# Patient Record
Sex: FEMALE | Race: WHITE | NOT HISPANIC OR LATINO | URBAN - METROPOLITAN AREA
[De-identification: names, ages, dates, MRNs, and addresses within clinical notes are randomized per-mention and may not be internally consistent; named-entity substitution may affect disease eponyms.]

---

## 2021-03-06 ENCOUNTER — APPOINTMENT (RX ONLY)
Dept: URBAN - METROPOLITAN AREA CLINIC 371 | Facility: CLINIC | Age: 31
Setting detail: DERMATOLOGY
End: 2021-03-06

## 2021-03-06 DIAGNOSIS — D22 MELANOCYTIC NEVI: ICD-10-CM

## 2021-03-06 DIAGNOSIS — L73.2 HIDRADENITIS SUPPURATIVA: ICD-10-CM | Status: INADEQUATELY CONTROLLED

## 2021-03-06 PROBLEM — D48.5 NEOPLASM OF UNCERTAIN BEHAVIOR OF SKIN: Status: ACTIVE | Noted: 2021-03-06

## 2021-03-06 PROCEDURE — 99204 OFFICE O/P NEW MOD 45 MIN: CPT | Mod: 25

## 2021-03-06 PROCEDURE — ? MEDICATION COUNSELING

## 2021-03-06 PROCEDURE — ? TREATMENT REGIMEN

## 2021-03-06 PROCEDURE — ? BIOPSY BY SHAVE METHOD

## 2021-03-06 PROCEDURE — ? PRESCRIPTION

## 2021-03-06 PROCEDURE — ? COUNSELING

## 2021-03-06 PROCEDURE — 11102 TANGNTL BX SKIN SINGLE LES: CPT

## 2021-03-06 PROCEDURE — ? ORDER TESTS

## 2021-03-06 RX ORDER — NEOMYCIN SULFATE AND FLUOCINOLONE ACETONIDE 3.5; .25 MG/G; MG/G
CREAM TOPICAL
Qty: 1 | Refills: 2 | Status: ERX | COMMUNITY
Start: 2021-03-06

## 2021-03-06 RX ORDER — DICAPRYLYL CARBONATE/DIMETH
SPRAY, NON-AEROSOL (ML) TOPICAL
Qty: 1 | Refills: 2 | Status: ERX | COMMUNITY
Start: 2021-03-06

## 2021-03-06 RX ORDER — SARECYCLINE HYDROCHLORIDE 150 MG/1
TABLET, COATED ORAL
Qty: 30 | Refills: 2 | Status: ERX | COMMUNITY
Start: 2021-03-06

## 2021-03-06 RX ADMIN — NEOMYCIN SULFATE AND FLUOCINOLONE ACETONIDE: 3.5; .25 CREAM TOPICAL at 00:00

## 2021-03-06 RX ADMIN — Medication: at 00:00

## 2021-03-06 RX ADMIN — SARECYCLINE HYDROCHLORIDE: 150 TABLET, COATED ORAL at 00:00

## 2021-03-06 ASSESSMENT — LOCATION SIMPLE DESCRIPTION DERM
LOCATION SIMPLE: LEFT INNER THIGH
LOCATION SIMPLE: ABDOMEN
LOCATION SIMPLE: LEFT UPPER BACK
LOCATION SIMPLE: LEFT AXILLARY VAULT
LOCATION SIMPLE: RIGHT INNER THIGH
LOCATION SIMPLE: RIGHT AXILLARY VAULT

## 2021-03-06 ASSESSMENT — LOCATION DETAILED DESCRIPTION DERM
LOCATION DETAILED: LEFT RIB CAGE
LOCATION DETAILED: LEFT AXILLARY VAULT
LOCATION DETAILED: RIGHT MEDIAL POSTERIOR THIGH
LOCATION DETAILED: RIGHT RIB CAGE
LOCATION DETAILED: LEFT MID-UPPER BACK
LOCATION DETAILED: RIGHT AXILLARY VAULT
LOCATION DETAILED: LEFT MEDIAL POSTERIOR THIGH

## 2021-03-06 ASSESSMENT — LOCATION ZONE DERM
LOCATION ZONE: TRUNK
LOCATION ZONE: AXILLAE
LOCATION ZONE: LEG

## 2021-03-06 NOTE — PROCEDURE: MEDICATION COUNSELING
comfortable appearance Rifampin Pregnancy And Lactation Text: This medication is Pregnancy Category C and it isn't know if it is safe during pregnancy. It is also excreted in breast milk and should not be used if you are breast feeding.

## 2021-03-06 NOTE — PROCEDURE: TREATMENT REGIMEN
Detail Level: Zone
Plan: Patient states she had GI Upset and diarrhea from doxycycline previously but not a true allergic reaction.  She stated it was worse with the 100mg dosage but not to the higher dosages of doxycycline\\n\\nWill plan to start Humira\\nPatient was given requisition form for the BW\\nHumira consent was given to patient\\n\\nPatient states she has a history of abnormal BW with her lymphocytes and was advised to consult with a hematologist.  Advised patient to consult with hematologist prior to starting Humira
Initiate Treatment: Cleanse with Levycin dermal spray twice a day \\nApply a thin layer of Ravinder-Synalar  to the affected areas twice a day \\nTake one tablet Seysara 150 mg daily

## 2021-03-06 NOTE — PROCEDURE: MEDICATION COUNSELING
Xelhugoz Pregnancy And Lactation Text: This medication is Pregnancy Category D and is not considered safe during pregnancy.  The risk during breast feeding is also uncertain. Xelhuogz Pregnancy And Lactation Text: This medication is Pregnancy Category D and is not considered safe during pregnancy.  The risk during breast feeding is also uncertain.

## 2021-03-06 NOTE — PROCEDURE: BIOPSY BY SHAVE METHOD
Dressing: Band-Aid
Hide Additional Size Dimension?: No
Information: Selecting Yes will display possible errors in your note based on the variables you have selected. This validation is only offered as a suggestion for you. PLEASE NOTE THAT THE VALIDATION TEXT WILL BE REMOVED WHEN YOU FINALIZE YOUR NOTE. IF YOU WANT TO FAX A PRELIMINARY NOTE YOU WILL NEED TO TOGGLE THIS TO 'NO' IF YOU DO NOT WANT IT IN YOUR FAXED NOTE.
Additional Anesthesia Volume In Cc (Will Not Render If 0): 0
Billing Type: Third-Party Bill
Size Of Lesion In Cm: 0.4
Detail Level: Detailed
Was A Bandage Applied: Yes
Type Of Destruction Used: Curettage
Notification Instructions: Patient will be notified of biopsy results. However, patient instructed to call the office if not contacted within 2 weeks.
Electrodesiccation Text: The wound bed was treated with electrodesiccation after the biopsy was performed.
Cryotherapy Text: The wound bed was treated with cryotherapy after the biopsy was performed.
Anesthesia Volume In Cc (Will Not Render If 0): 1
Wound Care: Petrolatum
Hemostasis: Aluminum Chloride
Biopsy Type: H and E
Electrodesiccation And Curettage Text: The wound bed was treated with electrodesiccation and curettage after the biopsy was performed.
Consent: Written consent was obtained and risks were reviewed including but not limited to scarring, infection, bleeding, scabbing, incomplete removal, nerve damage and allergy to anesthesia.
Post-Care Instructions: I reviewed with the patient in detail post-care instructions. Patient is to keep the biopsy site dry overnight, and then apply bactroban ointment twice daily until healed. Patient may apply hydrogen peroxide soaks to remove any crusting.
Anesthesia Type: 1% lidocaine with epinephrine and a 1:10 solution of 8.4% sodium bicarbonate
Depth Of Biopsy: dermis
Biopsy Method: Dermablade
Silver Nitrate Text: The wound bed was treated with silver nitrate after the biopsy was performed.

## 2021-03-09 ENCOUNTER — RX ONLY (OUTPATIENT)
Age: 31
Setting detail: RX ONLY
End: 2021-03-09

## 2021-03-09 RX ORDER — CEPHALEXIN 500 MG/1
CAPSULE ORAL
Qty: 20 | Refills: 0 | Status: ERX | COMMUNITY
Start: 2021-03-09

## 2021-03-09 RX ORDER — CHLORHEXIDINE GLUCONATE 213 G/1000ML
SOLUTION TOPICAL
Qty: 1 | Refills: 3 | Status: ERX | COMMUNITY
Start: 2021-03-09

## 2021-03-09 RX ORDER — MOMETASONE FUROATE 1 MG/G
CREAM TOPICAL
Qty: 1 | Refills: 2 | Status: ERX | COMMUNITY
Start: 2021-03-09

## 2021-03-09 RX ORDER — CLINDAMYCIN PHOSPHATE 10 MG/ML
LOTION TOPICAL
Qty: 1 | Refills: 0 | Status: ERX | COMMUNITY
Start: 2021-03-09

## 2021-04-03 ENCOUNTER — APPOINTMENT (RX ONLY)
Dept: URBAN - METROPOLITAN AREA CLINIC 371 | Facility: CLINIC | Age: 31
Setting detail: DERMATOLOGY
End: 2021-04-03

## 2021-04-03 DIAGNOSIS — L81.4 OTHER MELANIN HYPERPIGMENTATION: ICD-10-CM

## 2021-04-03 DIAGNOSIS — D18.0 HEMANGIOMA: ICD-10-CM

## 2021-04-03 DIAGNOSIS — D22 MELANOCYTIC NEVI: ICD-10-CM

## 2021-04-03 DIAGNOSIS — L73.2 HIDRADENITIS SUPPURATIVA: ICD-10-CM | Status: INADEQUATELY CONTROLLED

## 2021-04-03 DIAGNOSIS — Z71.89 OTHER SPECIFIED COUNSELING: ICD-10-CM

## 2021-04-03 DIAGNOSIS — L82.1 OTHER SEBORRHEIC KERATOSIS: ICD-10-CM

## 2021-04-03 PROBLEM — D22.5 MELANOCYTIC NEVI OF TRUNK: Status: ACTIVE | Noted: 2021-04-03

## 2021-04-03 PROBLEM — D18.01 HEMANGIOMA OF SKIN AND SUBCUTANEOUS TISSUE: Status: ACTIVE | Noted: 2021-04-03

## 2021-04-03 PROCEDURE — ? MEDICATION COUNSELING

## 2021-04-03 PROCEDURE — ? TREATMENT REGIMEN

## 2021-04-03 PROCEDURE — 99214 OFFICE O/P EST MOD 30 MIN: CPT

## 2021-04-03 PROCEDURE — ? PRESCRIPTION

## 2021-04-03 PROCEDURE — ? COUNSELING

## 2021-04-03 RX ORDER — CLINDAMYCIN PHOSPHATE 10 MG/G
GEL TOPICAL
Qty: 1 | Refills: 2 | Status: CANCELLED
Stop reason: CLARIF

## 2021-04-03 ASSESSMENT — LOCATION ZONE DERM
LOCATION ZONE: LEG
LOCATION ZONE: AXILLAE
LOCATION ZONE: TRUNK

## 2021-04-03 ASSESSMENT — LOCATION SIMPLE DESCRIPTION DERM
LOCATION SIMPLE: LEFT INNER THIGH
LOCATION SIMPLE: ABDOMEN
LOCATION SIMPLE: RIGHT AXILLARY VAULT
LOCATION SIMPLE: LEFT AXILLARY VAULT
LOCATION SIMPLE: RIGHT INNER THIGH

## 2021-04-03 ASSESSMENT — LOCATION DETAILED DESCRIPTION DERM
LOCATION DETAILED: EPIGASTRIC SKIN
LOCATION DETAILED: LEFT AXILLARY VAULT
LOCATION DETAILED: RIGHT RIB CAGE
LOCATION DETAILED: RIGHT AXILLARY VAULT
LOCATION DETAILED: RIGHT MEDIAL POSTERIOR THIGH
LOCATION DETAILED: LEFT MEDIAL POSTERIOR THIGH
LOCATION DETAILED: LEFT RIB CAGE

## 2021-04-03 NOTE — PROCEDURE: MEDICATION COUNSELING
Xelhugoz Pregnancy And Lactation Text: This medication is Pregnancy Category D and is not considered safe during pregnancy.  The risk during breast feeding is also uncertain.

## 2021-04-03 NOTE — PROCEDURE: TREATMENT REGIMEN
Modify Regimen: Cleanse with hibiclens twice a day \\nApply clindamycin and mometasone mixed together and apply twice daily \\nTake one tablet Seysara 150 mg daily
Plan: Patient states she had GI Upset and diarrhea from doxycycline previously but not a true allergic reaction.  She stated it was worse with the 100mg dosage but not to the higher dosages of doxycycline\\n\\nWill plan to start Humira\\nPatient was given requisition form for the BW last visit but has not had blood taken yet. \\n\\nPatient states she has a history of abnormal BW with her lymphocytes and was advised to consult with a hematologist.( pt has not done so yet) Advised patient to consult with hematologist prior to starting Humira
Detail Level: Zone

## 2021-11-06 NOTE — PROCEDURE: MEDICATION COUNSELING
06-Nov-2021 10:50 Propranolol Counseling:  I discussed with the patient the risks of propranolol including but not limited to low heart rate, low blood pressure, low blood sugar, restlessness and increased cold sensitivity. They should call the office if they experience any of these side effects.

## 2022-08-01 NOTE — PROCEDURE: MEDICATION COUNSELING
CT A/P with R portal vein thrombosis  -Per primary team, off AC (per family request). Ketoconazole Counseling:   Patient counseled regarding improving absorption with orange juice.  Adverse effects include but are not limited to breast enlargement, headache, diarrhea, nausea, upset stomach, liver function test abnormalities, taste disturbance, and stomach pain.  There is a rare possibility of liver failure that can occur when taking ketoconazole. The patient understands that monitoring of LFTs may be required, especially at baseline. The patient verbalized understanding of the proper use and possible adverse effects of ketoconazole.  All of the patient's questions and concerns were addressed.